# Patient Record
Sex: FEMALE | Race: WHITE | NOT HISPANIC OR LATINO | Employment: OTHER | URBAN - METROPOLITAN AREA
[De-identification: names, ages, dates, MRNs, and addresses within clinical notes are randomized per-mention and may not be internally consistent; named-entity substitution may affect disease eponyms.]

---

## 2017-07-28 ENCOUNTER — TRANSCRIBE ORDERS (OUTPATIENT)
Dept: ADMINISTRATIVE | Facility: HOSPITAL | Age: 82
End: 2017-07-28

## 2017-07-28 DIAGNOSIS — R13.10 DYSPHAGIA, UNSPECIFIED TYPE: Primary | ICD-10-CM

## 2017-08-08 ENCOUNTER — GENERIC CONVERSION - ENCOUNTER (OUTPATIENT)
Dept: OTHER | Facility: OTHER | Age: 82
End: 2017-08-08

## 2017-08-08 ENCOUNTER — HOSPITAL ENCOUNTER (OUTPATIENT)
Dept: RADIOLOGY | Facility: HOSPITAL | Age: 82
Discharge: HOME | End: 2017-08-08
Payer: MEDICARE

## 2017-08-08 DIAGNOSIS — R13.10 DYSPHAGIA, UNSPECIFIED TYPE: ICD-10-CM

## 2017-08-08 PROCEDURE — 92611 MOTION FLUOROSCOPY/SWALLOW: CPT

## 2017-08-08 PROCEDURE — G8996 SWALLOW CURRENT STATUS: HCPCS

## 2017-08-08 PROCEDURE — G8998 SWALLOW D/C STATUS: HCPCS

## 2017-08-08 PROCEDURE — G8997 SWALLOW GOAL STATUS: HCPCS

## 2017-08-08 PROCEDURE — 74230 X-RAY XM SWLNG FUNCJ C+: CPT

## 2017-08-14 ENCOUNTER — GENERIC CONVERSION - ENCOUNTER (OUTPATIENT)
Dept: OTHER | Facility: OTHER | Age: 82
End: 2017-08-14

## 2018-01-13 NOTE — RESULT NOTES
Verified Results  FL BARIUM Avelina Gómez SPEECH [id number] 10:13AM Mary Ruiz     Test Name Result Flag Reference   FL BARIUM SWALLOW Raymond Marvin (Report)     A video barium swallow study was performed by the Department of Speech Pathology  Please refer to the report for the official interpretation  The images are stored in PACS for archival purposes only  Study images were not formally reviewed by the Radiology Department     [email or web address]   Modality Radiation Exposure Data    Order Radiation    Type Dose Range    Radiation Dose 23 88 mGy 0 - 30 mGy

## 2018-01-17 NOTE — PROCEDURES
Procedures by BREANNE Gates at 8/8/2017 11:00 AM      Author:  BREANNE Gates Service:  (none) Author Type:  Speech and Language Pathologist    Filed:  8/8/2017  1:02 PM Date of Service:  8/8/2017 11:00 AM Status:  Signed    :  BREANNE Gates (Speech and Language Pathologist)        Pre-procedure Diagnoses:       1  Gastroesophageal reflux disease, esophagitis presence not specified [K21 9]                Post-procedure Diagnoses:       1  Gastroesophageal reflux disease, esophagitis presence not specified [K21 9]                Procedures:       1  FL BARIUM Joaquinacarl Shane SPEECH [EIS075 (Custom)]                                                    Video Swallow Study      Patient Name: Yenni RODRIGUEZ Date: 8/8/2017        Past Medical History  Past Medical History:     Diagnosis  Date    Anemia     Falls     Hypertension     Multiple thyroid nodules     Osteoporosis     Subdural hematoma      par  Past Surgical History  Past Surgical History:      Procedure  Laterality Date    JOINT REPLACEMENT      ORIF HIP FRACTURE Left      Pt is a 81 y/o F referred for a VBS from nursing home  SLP accompanied patient and provided a brief history  For approximately 6 months, pt has been seen multiple times by speech for intermittent  coughing c meals  Pt has been recommended for different diets but continues to have difficulties  VBS ordered to determine presence of aspiration and determine safest least restrictive diet  Previous VBS: No    Current Diet: Level 1 puree c NTL     Vocal Quality/Speech: WFL    Cognitive status: appears grossly intact      Consistencies administered: Barium laden applesauce, banana, honey thick, nectar thick, thin liquids  Liquids were administered by tsp, cup and straw  Pt was seated laterally at 90 degrees  Oral stage:   Moderately Impaired  Lip closure: adequate  Mastication: mildly prolonged  Bolus formation: adequate  Bolus control:mildly decreased  Transfer: mild-mod delayed  Residue: mild-mod    Pharyngeal stage: Moderately-Severely Impaired  Swallow promptness: prompt to mildly delayed  Spill to valleculae: yes, c thin liquid  Spill to pyriforms: yes, c vallecular residue  Epiglottic inversion: minimal to no inversion  Laryngeal rise: weak  Pharyngeal constriction: decreased  Vallecular retention: yes  Pyriform retention: yes  PPW coating: yes  Osteophytes: no  CP prominence: no  Transient penetration: yes  Epiglottic undercoat: yes  Penetration: yes  Aspiration: yes  Strategies: alternating food/liquid, prompted cough  Response to aspiration: none    Screening of Esophageal stage:  Delayed clearance from the distal esophagus    Summary:  Pt presents c mod-severe oropharyngeal dysphagia characterized by prolonged holding, tongue pump transfer, weak tongue drive, significant vallecular retention as well as posterior wall retention and spillage of retention to the pyriforms  Pt noted to initially have deep penetration to the level of the vocal cords posteriorly c puree  Pt prompted to cough, which was weak and unable to clear penetration  No further penetration noted c puree  When trialing HTL and NTL, pt tolerated both without  penetration/aspiration but noted to have decreased vallecular retention c NTL, reducing the risk for overflow penetration/aspiration  Pt tolerated trial of level 2 soft banana but noted to have vallecular retention which was significantly decreased c  a liquid wash  When trialing thin liquid via straw sips, pt noted to have episodes of transient penetration and penetration to the vocal cords with no response  Of note, pt unable to clear penetration to the cords during study and ultimately aspirated  with no response  Scanned the esophagus upon completion of study and pt noted to have delayed clearance/retention at the distal esophagus       Images available to be viewed on PACS however, due to technical difficulties c equipment, not all images were captured  Recommendations:  Diet: level 1 puree  Liquids: NTL  Meds: crush in applesauce, use liquid wash between bites  Strategies: prompt for a double swallow, strictly alternate food/liquid, small bites/sips, prompt for intermittent throat clear/cough to attempt to clear penetration through out meals  Upright position  F/u ST tx: yes to educate on strategies and ensure carryover  Full Supervision  Aspiration Precautions  Reflux Precautions  Results reviewed with: pt, SLP  If a dedicated assessment of the esophagus is desired, consider esophagram/barium swallow                        Received for:Provider  EPIC   Aug  8 2017  1:03PM [facility] Standard Time

## 2019-05-21 ENCOUNTER — NURSING HOME VISIT (OUTPATIENT)
Dept: GERIATRICS | Facility: OTHER | Age: 84
End: 2019-05-21
Payer: MEDICARE

## 2019-05-21 DIAGNOSIS — H61.23 BILATERAL IMPACTED CERUMEN: Primary | ICD-10-CM

## 2019-05-21 PROBLEM — H61.20 CERUMEN IMPACTION: Status: ACTIVE | Noted: 2019-05-21

## 2019-05-21 PROCEDURE — 99307 SBSQ NF CARE SF MDM 10: CPT | Performed by: NURSE PRACTITIONER

## 2019-05-24 ENCOUNTER — NURSING HOME VISIT (OUTPATIENT)
Dept: GERIATRICS | Facility: OTHER | Age: 84
End: 2019-05-24
Payer: MEDICARE

## 2019-05-24 DIAGNOSIS — F06.30 MOOD DISORDER DUE TO A GENERAL MEDICAL CONDITION: ICD-10-CM

## 2019-05-24 DIAGNOSIS — I10 BENIGN ESSENTIAL HTN: Primary | Chronic | ICD-10-CM

## 2019-05-24 DIAGNOSIS — E03.8 OTHER SPECIFIED HYPOTHYROIDISM: ICD-10-CM

## 2019-05-24 PROBLEM — H61.20 CERUMEN IMPACTION: Status: RESOLVED | Noted: 2019-05-21 | Resolved: 2019-05-24

## 2019-05-24 PROCEDURE — 99309 SBSQ NF CARE MODERATE MDM 30: CPT | Performed by: INTERNAL MEDICINE

## 2019-07-17 ENCOUNTER — NURSING HOME VISIT (OUTPATIENT)
Dept: GERIATRICS | Facility: OTHER | Age: 84
End: 2019-07-17
Payer: MEDICARE

## 2019-07-17 DIAGNOSIS — J44.9 CHRONIC OBSTRUCTIVE PULMONARY DISEASE, UNSPECIFIED COPD TYPE (HCC): ICD-10-CM

## 2019-07-17 DIAGNOSIS — F03.90 DEMENTIA WITHOUT BEHAVIORAL DISTURBANCE, UNSPECIFIED DEMENTIA TYPE (HCC): ICD-10-CM

## 2019-07-17 DIAGNOSIS — I10 BENIGN ESSENTIAL HTN: Chronic | ICD-10-CM

## 2019-07-17 DIAGNOSIS — R53.81 DEBILITY: Primary | ICD-10-CM

## 2019-07-17 DIAGNOSIS — I87.303 STASIS EDEMA OF BOTH LOWER EXTREMITIES: ICD-10-CM

## 2019-07-17 DIAGNOSIS — F06.30 MOOD DISORDER DUE TO A GENERAL MEDICAL CONDITION: ICD-10-CM

## 2019-07-17 DIAGNOSIS — E03.8 OTHER SPECIFIED HYPOTHYROIDISM: ICD-10-CM

## 2019-07-17 PROCEDURE — 99309 SBSQ NF CARE MODERATE MDM 30: CPT | Performed by: NURSE PRACTITIONER

## 2019-07-17 NOTE — ASSESSMENT & PLAN NOTE
-stable; no edema noted on today's exam   -weights stable  -continue lasix 20 mg daily with potassium supplementation   -continue to monitor

## 2019-07-17 NOTE — ASSESSMENT & PLAN NOTE
-stable  -last TSH 2 71 (3/2017); repeat level ordered for 7/18/19  -continue levothyroxine 50 mcg daily

## 2019-07-17 NOTE — ASSESSMENT & PLAN NOTE
-stable; no s/sx   -continue breo ellipta and incruse ellipta daily, and ventolin PRN  -c/s pulm as needed   -continue to monitor

## 2019-07-17 NOTE — ASSESSMENT & PLAN NOTE
-progressing   -no change in mood or behavior  -c/s psych as ordered   -weights stable  -requiring 24/7 support at [facility] for all ADLs; able to feed oneself   -continue safe environment   -continue to monitor

## 2019-07-17 NOTE — ASSESSMENT & PLAN NOTE
-stable; BP logs reviewed   -continue lasix 20 mg daily and irbesartan 150mg daily   -continue to monitor

## 2019-07-17 NOTE — ASSESSMENT & PLAN NOTE
-no change in mood or behavior from nursing   -encourage activity and socialization as tolerated   -continue lexapro 10 mg daily and clonazepam 0 5mg (half tab) q 12   -continue to monitor

## 2019-07-17 NOTE — PROGRESS NOTES
[facility]  [address]  [phone] [address]  Code 32      NAME: Anabell Daniel  AGE: 80 y o  SEX: female    : 1919    Code Status: AND/DNR    DATE OF ENCOUNTER: 2019    Assessment and Plan     Problem List Items Addressed This Visit        Endocrine    Other specified hypothyroidism     -stable  -last TSH 2 71 (3/2017); repeat level ordered for 19  -continue levothyroxine 50 mcg daily             Respiratory    COPD (chronic obstructive pulmonary disease) ([facility] [address] )     -stable; no s/sx   -continue breo ellipta and incruse ellipta daily, and ventolin PRN  -c/s pulm as needed   -continue to monitor             Cardiovascular and Mediastinum    Benign essential HTN (Chronic)     -stable; BP logs reviewed   -continue lasix 20 mg daily and irbesartan 150mg daily   -continue to monitor             Nervous and Auditory    Mood disorder due to a general medical condition     -no change in mood or behavior from nursing   -encourage activity and socialization as tolerated   -continue lexapro 10 mg daily and clonazepam 0 5mg (half tab) q 12   -continue to monitor          Dementia     -progressing   -no change in mood or behavior  -c/s psych as ordered   -weights stable  -requiring 24/7 support at LT for all ADLs; able to feed oneself   -continue safe environment   -continue to monitor             Other    Stasis edema of both lower extremities     -stable; no edema noted on today's exam   -weights stable  -continue lasix 20 mg daily with potassium supplementation   -continue to monitor          Debility - Primary     -secondary to chronic medical conditions  -continue support 24/7 at [facility]               No orders of the defined types were placed in this encounter  Chief Complaint     Debility and follow-up of chronic medical conditions      History of Present Illness     79 yo female, resident of [address] seen in collaboration with nursing to evaluate debility secondary to her chronic medical conditions including but not limited to HTN, COPD, dementia, mood disorder and hypothyroidism  No concerns from nursing  Upon exam, resident sitting in wheelchair without any distress  No complaints except for interrupted sleep from nursing staff waking her up in the AM  Denied pain, chest pain, shortness of breath, N/V/D or constipation  ROS limited secondary to dementia  The following portions of the patient's history were reviewed and updated as appropriate: allergies, current medications, past family history, past medical history, past social history, past surgical history and problem list     Review of Systems   ROS limited secondary to dementia     Review of Systems   Constitutional: Negative for chills and fatigue  Respiratory: Negative for cough and shortness of breath  Cardiovascular: Negative for chest pain, palpitations and leg swelling  Gastrointestinal: Negative for abdominal distention, abdominal pain, constipation, diarrhea, nausea and vomiting  Musculoskeletal: Negative for arthralgias  Neurological: Negative for dizziness, light-headedness and headaches  Psychiatric/Behavioral: Positive for sleep disturbance  The patient is not nervous/anxious  Active Problem List     Patient Active Problem List   Diagnosis    Benign essential HTN    Stasis edema of both lower extremities    Other specified hypothyroidism    Mood disorder due to a general medical condition    Gastroesophageal reflux disease without esophagitis    Osteoporosis    Subdural hematoma (HCC)    Closed right hip fracture (HCC)    Dementia    COPD [facility]chronic obstructive pulmonary disease) ([facility] [address] )    Debility       Objective     BP: 130/80, HR 64, temp: 97 8, RR 18, weight: 129 1 lbs (stable)    Physical Exam   Constitutional: She appears well-developed and well-nourished  No distress     Cooperative during exam; appearing stated age    Eyes: Pupils are equal, round, and reactive to light  Right eye exhibits no discharge  Left eye exhibits no discharge  Cardiovascular: Normal rate, regular rhythm, normal heart sounds and intact distal pulses  No murmur heard  No edema     Pulmonary/Chest: Effort normal and breath sounds normal  No respiratory distress  She has no wheezes  She has no rales  Abdominal: Soft  Bowel sounds are normal  She exhibits no distension  There is no tenderness  Neurological: She is alert  Oriented to name    Skin: Skin is warm and dry  Capillary refill takes less than 2 seconds  She is not diaphoretic  No erythema  No pallor  Dry areas of skin on face     Nursing note and vitals reviewed  Pertinent Laboratory/Diagnostic Studies:  Reviewed (BMP 2/2019)    Current Medications       Medications reviewed and updated, see facility [facility] for details       KARINA Lindsey   [facility]  7/17/2019 3:32 PM

## 2019-08-07 ENCOUNTER — NURSING HOME VISIT (OUTPATIENT)
Dept: GERIATRICS | Facility: OTHER | Age: 84
End: 2019-08-07
Payer: MEDICARE

## 2019-08-07 DIAGNOSIS — H61.23 IMPACTED CERUMEN OF BOTH EARS: Primary | ICD-10-CM

## 2019-08-07 PROCEDURE — 69209 REMOVE IMPACTED EAR WAX UNI: CPT | Performed by: NURSE PRACTITIONER

## 2019-08-07 NOTE — PROGRESS NOTES
[facility]  [address]  [phone]) [address]  Code  32      NAME: Nora Bender  AGE: 80 y o  SEX: female    : 1919    Code Status: AND/DNR    DATE OF ENCOUNTER: 2019    Assessment and Plan     Problem List Items Addressed This Visit        Nervous and Auditory    Impacted cerumen of both ears - Primary     -recurrent condition   -debrox given x 4 days and now here today to perform ear lavage (last done in 2019)  -resident denies decreased hearing, pain, irritation, dizziness or any other associated s/sx   -see procedural note for further details   -continue to re-evaluate as needed                Orders Placed This Encounter   Procedures    Ear cerumen removal           Chief Complaint     B/L impacted cerumen     History of Present Illness     79 yo female, resident of [address], seen in collaboration with nursing to perform ear lavage for bilateral impacted cerumen  Resident underwent a 4 day course of debrox ear gtts  No concerns from nursing  Upon exam, resident sitting comfortably in her wheelchair without any distress  ROS limited secondary to dementia        The following portions of the patient's history were reviewed and updated as appropriate: allergies, current medications, past family history, past medical history, past social history, past surgical history and problem list     Review of Systems     Review of Systems   Unable to perform ROS: Dementia       Active Problem List     Patient Active Problem List   Diagnosis    Benign essential HTN    Stasis edema of both lower extremities    Other specified hypothyroidism    Mood disorder due to a general medical condition    Gastroesophageal reflux disease without esophagitis    Osteoporosis    Subdural hematoma ([address] )    Closed right hip fracture ([address] )    Dementia    COPD (chronic obstructive pulmonary disease) ([address] )    Debility    Impacted cerumen of both ears       Objective     Ear cerumen removal  Date/Time: 8/7/2019 10:06 AM  Performed by: KARINA Mejia  Authorized by: KARINA Mejia     Patient location:  Bedside  Other Assisting Provider: No    Consent:     Consent obtained:  Verbal    Consent given by:  Patient    Risks discussed:  Pain, incomplete removal and dizziness    Alternatives discussed:  Observation  Universal protocol:     Procedure explained and questions answered to patient or proxy's satisfaction: yes      Site/side marked: yes      Patient identity confirmed:  Verbally with patient  Procedure details:     Local anesthetic:  None    Location:  L ear and R ear    Procedure type: irrigation only      Equipment used:  Otoclear ear lavage   Post-procedure details:     Complication:  None    Post-procedure hearing quality: unable to assess     Patient tolerance of procedure:  Procedure terminated at patient's request  Comments:      Somewhat successful ear lavage this morning  Half impacted cerumen removed from bilateral ears  Resident asked me to stop after she started verbalizing that it was too uncomfortable for her as well as nonverbal s/sx of discomfort  Right and left ear canal and TM were still able to be visualized  No s/sx of infection or fluid  Physical Exam   Constitutional:   Appearing chronically ill    HENT:   Right Ear: Tympanic membrane, external ear and ear canal normal  No swelling or tenderness  Tympanic membrane is not injected, not erythematous and not bulging  No middle ear effusion  Left Ear: Tympanic membrane, external ear and ear canal normal  No swelling or tenderness  Tympanic membrane is not injected, not perforated, not erythematous and not bulging  No middle ear effusion  Eyes: Pupils are equal, round, and reactive to light  Right eye exhibits no discharge  Left eye exhibits no discharge  Neurological: She is alert     Psychiatric:   cooperative for most of the ear lavage; however, terminated early for discomfort    Nursing note and vitals reviewed  Pertinent Laboratory/Diagnostic Studies:  n/a    Current Medications       Medications reviewed and updated, see facility [facility] for details       KARINA Arnold   [facility]  8/7/2019 10:18 AM

## 2019-08-07 NOTE — ASSESSMENT & PLAN NOTE
-recurrent condition   -debrox given x 4 days and now here today to perform ear lavage (last done in 5/2019)  -resident denies decreased hearing, pain, irritation, dizziness or any other associated s/sx   -see procedural note for further details   -continue to re-evaluate as needed

## 2019-08-09 ENCOUNTER — NURSING HOME VISIT (OUTPATIENT)
Dept: GERIATRICS | Facility: OTHER | Age: 84
End: 2019-08-09
Payer: MEDICARE

## 2019-08-09 DIAGNOSIS — R05.9 COUGH: Primary | ICD-10-CM

## 2019-08-09 PROCEDURE — 99307 SBSQ NF CARE SF MDM 10: CPT | Performed by: NURSE PRACTITIONER

## 2019-08-09 NOTE — PROGRESS NOTES
[facility]  [address]  [phone]) [address]  Code 32    NAME: Jenny Ruiz  AGE: 80 y o  SEX: female    : 1919    Code Status: AND/DNR    DATE OF ENCOUNTER: 2019    Assessment and Plan     Problem List Items Addressed This Visit        Other    Cough - Primary     -productive cough developed overnight accompanied by coarse breath sounds; afebrile; tolerating PO intake  -history of COPD  -will start to treat conservatively with scheduled neb treatments and Robafen DM   -VS q shift x 5 days   -continue to monitor for any clinical decline   -DWN               No orders of the defined types were placed in this encounter  Chief Complaint     Productive cough     History of Present Illness     81 yo female, resident of [address] [place] [address], seen in collaboration with nursing to evaluate her change in clinical status  Nursing noted that resident developed a productive cough overnight with coarse breath sounds; no other associated signs or symptoms and abebrile  No other concerns from nursing  Upon assesssment, resident sitting in her wheelchair without any non-verbal s/sx of discomfort or respiratory distress  ROS limited secondary to dementia  The following portions of the patient's history were reviewed and updated as appropriate: allergies, current medications, past family history, past medical history, past social history, past surgical history and problem list     Review of Systems     ROS limited secondary to dementia  Review of Systems   HENT: Negative for ear pain and sore throat  Respiratory: Positive for cough  Negative for shortness of breath  Musculoskeletal: Negative for arthralgias         Active Problem List     Patient Active Problem List   Diagnosis    Benign essential HTN    Stasis edema of both lower extremities    Other specified hypothyroidism    Mood disorder due to a general medical condition    Gastroesophageal reflux disease without esophagitis    Osteoporosis    Subdural hematoma (HCC)    Closed right hip fracture (HCC)    Dementia    COPD (chronic obstructive pulmonary disease) (HCC)    Debility    Impacted cerumen of both ears    Cough       Objective     /70, HR 80, temp: 98 2, RR 18, 93% RA     Physical Exam   Constitutional: No distress  Chronically ill appearing    HENT:   Nose: Nose normal    Mouth/Throat: Oropharynx is clear and moist  No oropharyngeal exudate  Eyes: Pupils are equal, round, and reactive to light  Conjunctivae are normal  Right eye exhibits no discharge  Left eye exhibits no discharge  Cardiovascular: Normal rate, regular rhythm, normal heart sounds and intact distal pulses  Pulmonary/Chest: No stridor  No respiratory distress  She has no wheezes  She has no rales  rhonchi heard throughout lung fields bilaterally; poor inspiratory effort    Abdominal: Soft  Bowel sounds are normal  She exhibits no distension  There is no tenderness  Neurological: She is alert  Skin: She is not diaphoretic  Nursing note and vitals reviewed  Pertinent Laboratory/Diagnostic Studies:  Reviewed     Current Medications       Medications reviewed and updated, see facility [facility] for details         KARINA Gonzalez   [facility]  8/9/2019 10:37 AM

## 2019-08-12 ENCOUNTER — NURSING HOME VISIT (OUTPATIENT)
Dept: GERIATRICS | Facility: OTHER | Age: 84
End: 2019-08-12
Payer: MEDICARE

## 2019-08-12 DIAGNOSIS — J06.9 VIRAL UPPER RESPIRATORY TRACT INFECTION: ICD-10-CM

## 2019-08-12 DIAGNOSIS — R50.9 LOW GRADE FEVER: Primary | ICD-10-CM

## 2019-08-12 PROCEDURE — 99307 SBSQ NF CARE SF MDM 10: CPT | Performed by: NURSE PRACTITIONER

## 2019-08-12 NOTE — ASSESSMENT & PLAN NOTE
Questionable COPD exacerbation  -chest xray   BMP and CBC   Add guanifesin PRN to scheduled doses of Robafen   Continue

## 2019-08-12 NOTE — PROGRESS NOTES
[facility]  [address]  [phone]) [address]  Code 32      NAME: Michelle Gore  AGE: 80 y o  SEX: female    : 1919    Code Status: AND/DNR    DATE OF ENCOUNTER: 2019    Assessment and Plan     Problem List Items Addressed This Visit        Respiratory    Viral upper respiratory tract infection     -no improvement in condition since   -Questionable COPD component vs PNA; will further evaluate with BMP, CBC and 2V chest xray   -Add guanifesin PRN with scheduled doses of Robafen and nebs  -IV gentle hydration   -Continue supportive therapy   -continue to monitor for any change in condition             Other    Low grade fever - Primary     -developed 99 9 temp earlier this AM accompanied by tachycardia   -Start gentle hydration in setting of decreased PO intake; 500ml over 8 hours   -encourage PO intake as tolerated  -PRN Tylenol                No orders of the defined types were placed in this encounter  Chief Complaint     Cough, fever and decreased PO intake     History of Present Illness     79 yo female, resident of [address], seen in collaboration with nursing to evaluate decline in condition  Resident was started on neb treatments and decongestants on  for suspected URI with no improvement over the weekend  This AM, resident developed fever of 99 9 accompanied by increased coughing, tachycardia and decreased PO intake  ROS limited secondary to confusion  She stated, "I don't feel well "     Discussed plan of care with nursing  The following portions of the patient's history were reviewed and updated as appropriate: allergies, current medications, past family history, past medical history, past social history, past surgical history and problem list     Review of Systems     ROS limited secondary to dementia       Review of Systems   Constitutional: Positive for activity change (decreased activity tolerance ) and fatigue  Respiratory: Positive for cough  Gastrointestinal: Negative for abdominal pain  Active Problem List     Patient Active Problem List   Diagnosis    Benign essential HTN    Stasis edema of both lower extremities    Other specified hypothyroidism    Mood disorder due to a general medical condition    Gastroesophageal reflux disease without esophagitis    Osteoporosis    Subdural hematoma (HCC)    Closed right hip fracture (HCC)    Dementia    COPD (chronic obstructive pulmonary disease) (HCC)    Debility    Impacted cerumen of both ears    Cough    Low grade fever    Viral upper respiratory tract infection       Objective     /64,  (palpated), 90% RA, temp: 99 9, RR 30    Physical Exam   Constitutional: She appears distressed (r/t fatigue )  HENT:   Nose: Nose normal    Mouth/Throat: Oropharynx is clear and moist  No oropharyngeal exudate  Cardiovascular: Regular rhythm, normal heart sounds and intact distal pulses  Tachycardia      Pulmonary/Chest: No stridor  She is in respiratory distress (r/t tachypnea )  She exhibits no tenderness  Rhonchi heard throughout lung fields; poor inspiratory effort    Abdominal: Soft  Bowel sounds are normal  She exhibits no distension  There is no tenderness  Neurological: She is alert  Skin: Skin is warm and dry  No rash noted  She is not diaphoretic  No erythema  No pallor  Nursing note and vitals reviewed  Pertinent Laboratory/Diagnostic Studies:  Reviewed     Current Medications       Medications reviewed and updated, see facility [facility] for details         KARINA Og   [facility]  8/12/2019 11:53 AM

## 2019-08-12 NOTE — ASSESSMENT & PLAN NOTE
-no improvement in condition since 8/9  -Questionable COPD component vs PNA; will further evaluate with BMP, CBC and 2V chest xray   -Add guanifesin PRN with scheduled doses of Robafen and nebs  -IV gentle hydration   -Continue supportive therapy   -continue to monitor for any change in condition

## 2019-08-12 NOTE — ASSESSMENT & PLAN NOTE
-developed 99 9 temp earlier this AM accompanied by tachycardia   -Start gentle hydration in setting of decreased PO intake; 500ml over 8 hours   -encourage PO intake as tolerated  -PRN Tylenol

## 2019-08-14 ENCOUNTER — NURSING HOME VISIT (OUTPATIENT)
Dept: GERIATRICS | Facility: OTHER | Age: 84
End: 2019-08-14
Payer: MEDICARE

## 2019-08-14 DIAGNOSIS — R06.03 RESPIRATORY DISTRESS: Primary | ICD-10-CM

## 2019-08-14 DIAGNOSIS — I10 BENIGN ESSENTIAL HTN: Chronic | ICD-10-CM

## 2019-08-14 DIAGNOSIS — F03.90 DEMENTIA WITHOUT BEHAVIORAL DISTURBANCE, UNSPECIFIED DEMENTIA TYPE (HCC): ICD-10-CM

## 2019-08-14 DIAGNOSIS — E03.8 OTHER SPECIFIED HYPOTHYROIDISM: ICD-10-CM

## 2019-08-14 DIAGNOSIS — R53.81 DEBILITY: ICD-10-CM

## 2019-08-14 DIAGNOSIS — Z51.5 COMFORT MEASURES ONLY STATUS: ICD-10-CM

## 2019-08-14 DIAGNOSIS — F06.30 MOOD DISORDER DUE TO A GENERAL MEDICAL CONDITION: ICD-10-CM

## 2019-08-14 PROCEDURE — 99310 SBSQ NF CARE HIGH MDM 45: CPT | Performed by: STUDENT IN AN ORGANIZED HEALTH CARE EDUCATION/TRAINING PROGRAM

## 2019-08-14 NOTE — PROGRESS NOTES
[address]  [phone]  [facility] Progress Note  Code:32        NAME: Haseeb Baugh  AGE: 80 y o  SEX: female    DATE OF ENCOUNTER: 8/14/2019    Assessment and Plan     Problem List Items Addressed This Visit        Endocrine    Other specified hypothyroidism     Patient's code status has been changed to comfort care measures  Will continue levothyroxine            Cardiovascular and Mediastinum    Benign essential HTN (Chronic)     /48  Blood pressure has been for gradually decreasing  Patient's code status has been changed to comfort care measures  Discontinue irbesartan  Continue Lasix daily            Nervous and Auditory    Mood disorder due to a general medical condition     Patient's code status is currently comfort care measures only  Discontinue the citalopram  Continue Klonopin/Roxanol/DuoNeb/bowel regimen p r n  Dementia     Patient's code status currently changed to comfort care measures  Continue Roxanol/DuoNeb/Klonopin p r n  Maintain [place] precautions            Other    Debility     Patient's code status has been switched to comfort care, after discussion with patient's [employer], her daughter  Chronic medications discontinued  Start Roxanol p r n   For shortness of breath/respiratory distress  Continue supportive care  Maintain [place] precautions         Respiratory distress - Primary     Patient currently on oxygen via nasal cannula at 1 liter/minute to maintain saturations above 88%   Tachypneic with RR 32 per minute, blood pressure decreased at 104/48  Chest x-ray done 8/12/19 showed chronic appearing interstitial lung changes with basilar pleural-parenchymal scarring and subsegmental atelectasis  Blood work reviewed from 08/12/2019 with no new significant abnormalities  After a detailed discussion at patient's daughter Janette Booth who is her power of  for healthcare, patient's code status was changed from DNI/DNR to comfort care  Start Roxanol 2 5 mg p o  Q 4h p r n  For shortness of breath/respiratory distress/pain  Start duo nebs q 4h p r n  For respiratory distress/shortness of breath  Discontinue Breo Ellipta, i           Comfort measures only status     Discussed with patient's daughter Andi Gorman, who is her power-of- for healthcare this morning about patient's current medical status  Code status was changed by her daughter to comfort care measures only   Patient noted to be in respiratory distress, extremely weak, frail and lethargic  Has been minimally verbal with marked decrease in p o  Intake  Currently requiring oxygen via nasal cannula for shortness of breath  Chronic medications discontinued  Start Roxanol 2 5 mg p o  Q 4h p r n  For shortness of breath/respiratory distress/pain  Continue levothyroxine, continue Klonopin p r n  For anxiety  Start duo nebs q 4h p r n  Respiratory distress  Continue bowel regimen  Maintain [place] precautions  Mattress to be changed  Nursing staff aware                   Chief Complaint     Requested by nurse to review patient was noted to be lethargic, short of breath in respiratory distress    History of Present Illness     HPI    This is a 70-year-old female with a past medical history of debility, hypertension, COPD, dementia and mood disorder who was noted to become unwell 1 week ago  Patient initially began having a cough and subsequent fever with T-max of 101° on 08/12  Chest x-ray and blood work were ordered which showed chronic appearing interstitial lung changes with basilar pleural parenchymal scarring and subsegmental atelectasis  No leukocytosis or leukopenia noted on blood work  Patient was started on duo nebs, guaifenesin and received 500 cc of IV fluids  This morning, staff stated that patient has had a marked decrease in p o  intake  Patient is extremely lethargic, frail and weak    She has been tachypneic with respiratory rate in the 30s and has required oxygen via nasal cannula to keep oxygen saturations above 88%  Patient is minimally verbal this morning and only answers to her first name when questioned  She has remained afebrile in the past 24 hours  I had a detailed discussion with patient's daughter Devendra Augustin on the telephone who is the patient's  power of  for healthcare  Daughter expresses that she would like no further active intervention in her mother's care as her mother had discussed with her in the past that she would not like any heroic measures should the patient's health deteriorate suddenly  Daughter declines any further workup inclusive of imaging studies or blood work  She also declines sending the patient out to hospital, starting treatment with any IV therapy,  increased diuretic therapy or antibiotics at this time  Patient's code status will be changed to comfort care measures only  We will start Roxanol and discontinue chronic medications and ensure that the patient's goal of care is comfort    Nursing care informed    Time spent in management of patient's acute condition > 40mins    The following portions of the patient's history were reviewed and updated as appropriate: allergies, current medications, past family history, past medical history, past social history, past surgical history and problem list     Review of Systems     Review of Systems   Unable to perform ROS: Acuity of condition   Patient extremely lethargic, tachypneic, unable to respond to questions    Active Problem List     Patient Active Problem List   Diagnosis    Benign essential HTN    Stasis edema of both lower extremities    Other specified hypothyroidism    Mood disorder due to a general medical condition    Gastroesophageal reflux disease without esophagitis    Osteoporosis    Subdural hematoma ([address] )    Cl[facility]    Dementia    COPD (chronic obstructive pulmonary disease) ([facility]    Debility    Impacted cerumen of both ears    Cough    Low grade fever    Viral upper respiratory tract infection    Respiratory distress    Comfort measures only status       Objective     Blood pressure 104/48, temperature 98, oxygen saturations 93% on 1 L oxygen via nasal cannula, pulse 84, RR 36, weight 129 lbs    Physical Exam   Constitutional: She appears distressed  Patient lying in bed with oxygen via nasal cannula in mild respiratory distress with tachypnea  Extremely lethargic and frail, minimally responsive to verbal questioning   HENT:   Head: Normocephalic and atraumatic  Nose: Nose normal    Eyes: Conjunctivae are normal  Right eye exhibits no discharge  Left eye exhibits no discharge  No scleral icterus  Neck: Neck supple  No JVD present  Cardiovascular: Regular rhythm  Murmur (ESM 2/6) heard  Pulmonary/Chest: She is in respiratory distress  She has rales  RR 32 per minute, on O2 1 liter/minute via nasal cannula  Air entry decreased bilaterally, rales throughout both left and right lung fields  No wheeze     Abdominal: Soft  Bowel sounds are normal  She exhibits no distension and no mass  There is no tenderness  There is no rebound and no guarding  Musculoskeletal: She exhibits edema  She exhibits no tenderness  Neurological:   Patient extremely lethargic, is able to respond only when asked her name which she gives her 1st name only   Skin: Capillary refill takes 2 to 3 seconds  She is diaphoretic  There is pallor  Pertinent Laboratory/Diagnostic Studies:  Bloodwork from 08/12/2019  Glucose 104, BUN 23, creatinine 0 9, sodium 140, potassium 4 8, chloride 104, CO2 31, calcium 8 7  WBC 4 6, HB 10 6, HCT 31 9,   Chest x-ray:  Chronic appearing interstitial changes with basilar pleural parenchymal scarring and subsegmental atelectasis    Current Medications   Medications were reviewed and updated in facility paper chart      Ruchi Staples MD  Geriatric Medicine  8/14/2019 11:06 AM

## 2019-08-14 NOTE — ASSESSMENT & PLAN NOTE
Patient currently on oxygen via nasal cannula at 1 liter/minute to maintain saturations above 88%   Tachypneic with RR 32 per minute, blood pressure decreased at 104/48  Chest x-ray done 8/12/19 showed chronic appearing interstitial lung changes with basilar pleural-parenchymal scarring and subsegmental atelectasis  Blood work reviewed from 08/12/2019 with no new significant abnormalities  After a detailed discussion at patient's daughter Liyah Larson who is her power of  for healthcare, patient's code status was changed from DNI/DNR to comfort care  Start Roxanol 2 5 mg p o  Q 4h p r n  For shortness of breath/respiratory distress/pain  Start duo nebs q 4h p r n   For respiratory distress/shortness of breath  Discontinue Breo Tra, i

## 2019-08-14 NOTE — ASSESSMENT & PLAN NOTE
Patient's code status currently changed to comfort care measures  Continue Roxanol/DuoNeb/Klonopin p r n    Maintain [place] precautions

## 2019-08-14 NOTE — ASSESSMENT & PLAN NOTE
Discussed with patient's daughter Balyee Kirkland, who is her power-of- for healthcare this morning about patient's current medical status  Code status was changed by her daughter to comfort care measures only   Patient noted to be in respiratory distress, extremely weak, frail and lethargic  Has been minimally verbal with marked decrease in p o  Intake  Currently requiring oxygen via nasal cannula for shortness of breath  Chronic medications discontinued  Start Roxanol 2 5 mg p o  Q 4h p r n  For shortness of breath/respiratory distress/pain  Continue levothyroxine, continue Klonopin p r n  For anxiety  Start duo nebs q 4h p r n   Respiratory distress  Continue bowel regimen  Maintain [place] precautions  Mattress to be changed  Nursing staff aware

## 2019-08-14 NOTE — ASSESSMENT & PLAN NOTE
Patient's code status has been switched to comfort care, after discussion with patient's Kelly Para, her daughter  Chronic medications discontinued  Start Roxanol p r n   For shortness of breath/respiratory distress  Continue supportive care  Maintain [place] precautions

## 2019-08-14 NOTE — ASSESSMENT & PLAN NOTE
Patient's code status is currently comfort care measures only  Discontinue the citalopram  Continue Klonopin/Roxanol/DuoNeb/bowel regimen p r n

## 2019-08-14 NOTE — ASSESSMENT & PLAN NOTE
/48  Blood pressure has been for gradually decreasing  Patient's code status has been changed to comfort care measures  Discontinue irbesartan  Continue Lasix daily

## 2019-08-21 ENCOUNTER — NURSING HOME VISIT (OUTPATIENT)
Dept: GERIATRICS | Facility: OTHER | Age: 84
End: 2019-08-21
Payer: MEDICARE

## 2019-08-21 DIAGNOSIS — R05.9 COUGH: Primary | ICD-10-CM

## 2019-08-21 DIAGNOSIS — J44.9 CHRONIC OBSTRUCTIVE PULMONARY DISEASE, UNSPECIFIED COPD TYPE (HCC): ICD-10-CM

## 2019-08-21 DIAGNOSIS — E03.8 OTHER SPECIFIED HYPOTHYROIDISM: ICD-10-CM

## 2019-08-21 DIAGNOSIS — F06.30 MOOD DISORDER DUE TO MEDICAL CONDITION: ICD-10-CM

## 2019-08-21 DIAGNOSIS — F03.90 DEMENTIA WITHOUT BEHAVIORAL DISTURBANCE, UNSPECIFIED DEMENTIA TYPE (HCC): ICD-10-CM

## 2019-08-21 DIAGNOSIS — R53.81 DEBILITY: ICD-10-CM

## 2019-08-21 DIAGNOSIS — Z51.5 COMFORT MEASURES ONLY STATUS: ICD-10-CM

## 2019-08-21 PROCEDURE — 99308 SBSQ NF CARE LOW MDM 20: CPT | Performed by: STUDENT IN AN ORGANIZED HEALTH CARE EDUCATION/TRAINING PROGRAM

## 2019-08-21 NOTE — ASSESSMENT & PLAN NOTE
Patient improved from last week, with marked improvement in respiratory distress    Awaiting hospice consult for continued management

## 2019-08-21 NOTE — ASSESSMENT & PLAN NOTE
Cough has been persistent    Will restart Robitussin cough medicine and Marceil Dariela for now  Patient is hospice care however will continue the above medications for current symptoms which were previously effective in alleviating her cough

## 2019-08-21 NOTE — ASSESSMENT & PLAN NOTE
Debility secondary to chronic comorbidities and recent respiratory illness  Maintain [place] precautions  Awaiting hospice consult as daughter had indicated preference for comfort care measures moving followed

## 2019-08-21 NOTE — ASSESSMENT & PLAN NOTE
Oxygen as needed via nasal cannula to keep saturations above 90% was patient is currently comfort measures only  Will restart Breo Ellipta which had alleviated patient's cough previously  Robitussin p r n    Awaiting hospice consult

## 2019-08-21 NOTE — PROGRESS NOTES
[address]  [phone]  [facility] Progress Note  Billing code:        NAME: Velma Whitfield  AGE: 80 y o  SEX: female    DATE OF ENCOUNTER: 8/21/2019    Assessment and Plan     Problem List Items Addressed This Visit        Endocrine    Other specified hypothyroidism     Continue levothyroxine daily  Awaiting hospice consult            Respiratory    COPD (chronic obstructive pulmonary disease) (HCC)     Oxygen as needed via nasal cannula to keep saturations above 90% was patient is currently comfort measures only  Will restart Breo Ellipta which had alleviated patient's cough previously  Robitussin p r n  Awaiting hospice consult            Nervous and Auditory    Mood disorder due to medical condition     Per nursing staff, since the citalopram was discontinued and patient has improved immensely, patient has been verbalizing symptoms of depression  Will restart his citalopram 10 mg daily  Continue Ativan 0 25 mg p o  B i d  prn for anxiety  Awaiting hospice consult         Dementia     Reorientation and redirection as needed  Maintain [place] precautions  Continue nutritional, supportive care  Continued social support            Other    Gloria Sexton secondary to chronic comorbidities and recent respiratory illness  Maintain [place] precautions  Awaiting hospice consult as daughter had indicated preference for comfort care measures moving followed         Cough - Primary     Cough has been persistent  Will restart Robitussin cough medicine and David Cruise for now  Patient is hospice care however will continue the above medications for current symptoms which were previously effective in alleviating her cough         Comfort measures only status     Patient improved from last week, with marked improvement in respiratory distress    Awaiting hospice consult for continued management                 Chief Complaint     Persistent cough per nursing staff    History of Present Illness     HPI  This is a 70-year-old female with a past medical history of COPD, hypothyroidism, HTN, dementia and osteoporosis amongst multiple other medical issues who was seen and examined at bedside for persistent cough  Per nursing staff, patient has has medically improved from 1 week ago where she had respiratory distress with subsequent code status changed to comfort care  Cough is persistent and associated with green-colored sputum  No fever, chills, chest pain, shortness of breath, nausea, vomiting or recurrence of cardiorespiratory distress  Given her history of COPD, cough was previously a deviated with use of her maintenance inhalers  Patient has been alert over the past few days, tolerating oral intake well and attempting to interact with staff  Currently awaiting hospice consult for continued management moving followed  She was previously on oxygen at 2 liters/minute which was weaned off this morning is oxygen saturations persisted in the high 90s  Per nursing staff, since improving markedly over the last few days, she has exhibited signs of depression and has also stated that she does not want to live  After episode of respiratory distress and code status changed to comfort measures only, antidepressant therapy along with other chronic medications  had been discontinued      The following portions of the patient's history were reviewed and updated as appropriate: allergies, current medications, past family history, past medical history, past social history, past surgical history and problem list     Review of Systems     Review of Systems   Unable to perform ROS: Dementia       Active Problem List     Patient Active Problem List   Diagnosis    Benign essential HTN    Stasis edema of both lower extremities    Other specified hypothyroidism    Mood disorder due to medical condition    Gastroesophageal reflux disease without esophagitis    Osteoporosis  Subdural hematoma (HCC)    Closed right hip fracture (HCC)    Dementia    COPD (chronic obstructive pulmonary disease) (HCC)    Debility    Impacted cerumen of both ears    Cough    Low grade fever    Viral upper respiratory tract infection    Respiratory distress    Comfort measures only status       Objective     /62, temp 99 2°, HR 76, O2 sat-98% on 2 L by nasal cannula oxygen, RR 18    Physical Exam   Constitutional:   Elderly frail female, sitting in wheelchair in no obvious cardiorespiratory distress  Oxygen via nasal cannula in situ   HENT:   Head: Normocephalic and atraumatic  Nose: Nose normal    Eyes: Conjunctivae are normal  Right eye exhibits no discharge  Left eye exhibits no discharge  No scleral icterus  Neck: Neck supple  Cardiovascular: Exam reveals no gallop and no friction rub  Murmur heard  Pulmonary/Chest: Effort normal  No stridor  No respiratory distress  She has no wheezes  She has rales  Air entry fair bilaterally, minimal wheeze, harsh breath sounds, coarse rales bilaterally in lung bases   Abdominal: Soft  Bowel sounds are normal  She exhibits distension  There is no tenderness  There is no guarding  Musculoskeletal: She exhibits edema  Neurological: She is alert  Oriented x 0  Patient remains calm, pleasant, cooperative  Does not follow commands   Skin: Skin is warm  There is pallor  Nursing note and vitals reviewed  Pertinent Laboratory/Diagnostic Studies:  Reviewed most recent labs  No blood work ordered    Current Medications   Medications were reviewed and updated in facility paper chart      Tana Fraga MD  Geriatric Medicine  8/21/2019 1:43 PM

## 2019-08-21 NOTE — ASSESSMENT & PLAN NOTE
Per nursing staff, since the citalopram was discontinued and patient has improved immensely, patient has been verbalizing symptoms of depression    Will restart his citalopram 10 mg daily  Continue Ativan 0 25 mg p o  B i d  prn for anxiety  Awaiting hospice consult

## 2019-08-21 NOTE — ASSESSMENT & PLAN NOTE
Reorientation and redirection as needed  Maintain [place] precautions  Continue nutritional, supportive care  Continued social support

## 2019-08-28 ENCOUNTER — NURSING HOME VISIT (OUTPATIENT)
Dept: GERIATRICS | Facility: OTHER | Age: 84
End: 2019-08-28
Payer: MEDICARE

## 2019-08-28 DIAGNOSIS — I10 BENIGN ESSENTIAL HTN: Chronic | ICD-10-CM

## 2019-08-28 DIAGNOSIS — F06.30 MOOD DISORDER DUE TO MEDICAL CONDITION: ICD-10-CM

## 2019-08-28 DIAGNOSIS — F03.90 DEMENTIA WITHOUT BEHAVIORAL DISTURBANCE, UNSPECIFIED DEMENTIA TYPE (HCC): ICD-10-CM

## 2019-08-28 DIAGNOSIS — R53.81 DEBILITY: Primary | ICD-10-CM

## 2019-08-28 DIAGNOSIS — Z51.5 COMFORT MEASURES ONLY STATUS: ICD-10-CM

## 2019-08-28 DIAGNOSIS — R05.9 COUGH: ICD-10-CM

## 2019-08-28 PROCEDURE — 99309 SBSQ NF CARE MODERATE MDM 30: CPT | Performed by: STUDENT IN AN ORGANIZED HEALTH CARE EDUCATION/TRAINING PROGRAM

## 2019-08-28 NOTE — ASSESSMENT & PLAN NOTE
Blood pressure remained stable  Most recent /56  Continue furosemide 20 mg daily, potassium supplementation  Patient is currently on comfort care  Will continue with routine nursing protocol for continued monitoring of blood pressure

## 2019-08-28 NOTE — ASSESSMENT & PLAN NOTE
Symptoms much improved  Patient was weaned off oxygen over a week ago  Maintaining saturations well on room air  Continue Robitussin p r n    Clinical exam of lungs was within normal limits

## 2019-08-28 NOTE — ASSESSMENT & PLAN NOTE
Patient continues as comfort care measures only  She has recovered from an acute episode of cardiorespiratory distress  Maintain [place] precautions  Continue nutritional support, supportive care

## 2019-08-28 NOTE — ASSESSMENT & PLAN NOTE
Patient confused at baseline though was oriented to her 1st name and place  Recommend reorientation and redirection as needed  Manage chronic conditions conservatively as patient is comfort care  Encourage patient to participate in social activities at nursing home  Continue supportive care, nutritional support  Maintain [place] precautions

## 2019-08-28 NOTE — ASSESSMENT & PLAN NOTE
After an episode of respiratory distress where patient's code status was changed to comfort care measures only  Ecitalopram was discontinued  After recovering from her acute illness episode, patient briefly verbalized symptoms of depression for which a citalopram 10 mg daily was restarted    Symptoms have remained stable since restarting therapy  Continue social support  Continue clonazepam 0 5 mg p o  Q 12h  Encourage patient to participate in social activities at nursing facility

## 2019-08-28 NOTE — ASSESSMENT & PLAN NOTE
Debility secondary to chronic medical conditions requiring 24/7 care at current nursing facility    Patient is currently comfort care measures only  Continue nutritional support, supportive care  Maintain [place] precautions

## 2019-08-28 NOTE — PROGRESS NOTES
[address]  [phone]  [facility] Progress Note  Billing code:32      NAME: Edda Raymundo  AGE: 80 y o  SEX: female    DATE OF ENCOUNTER: 8/28/2019    Assessment and Plan     Problem List Items Addressed This Visit        Cardiovascular and Mediastinum    Benign essential HTN (Chronic)     Blood pressure remained stable  Most recent /56  Continue furosemide 20 mg daily, potassium supplementation  Patient is currently on comfort care  Will continue with routine nursing protocol for continued monitoring of blood pressure            Nervous and Auditory    Mood disorder due to medical condition     After an episode of respiratory distress where patient's code status was changed to comfort care measures only  Ecitalopram was discontinued  After recovering from her acute illness episode, patient briefly verbalized symptoms of depression for which a citalopram 10 mg daily was restarted  Symptoms have remained stable since restarting therapy  Continue social support  Continue clonazepam 0 5 mg p o  Q 12h  Encourage patient to participate in social activities at nursing facility           Dementia     Patient confused at baseline though was oriented to her 1st name and place  Recommend reorientation and redirection as needed  Manage chronic conditions conservatively as patient is comfort care  Encourage patient to participate in social activities at nursing home  Continue supportive care, nutritional support  Maintain Falls precautions            Other    Debility - Primary     Debility secondary to chronic medical conditions requiring 24/7 care at current nursing facility    Patient is currently comfort care measures only  Continue nutritional support, supportive care  Maintain Falls precautions         Cough     Symptoms much improved  Patient was weaned off oxygen over a week ago  Maintaining saturations well on room air  Continue Robitussin p r n  Clinical exam of lungs was within normal limits         Comfort measures only status     Patient continues as comfort care measures only  She has recovered from an acute episode of cardiorespiratory distress  Maintain Falls precautions  Continue nutritional support, supportive care               Chief Complaint     Patient reviewed for routine long-term monthly rounds for follow-up of chronic conditions    History of Present Illness     HPI  Patient seen and examined at bedside  Unable to perform review of systems given patient's history of dementia  Per nursing staff, patient has had no acute complaints  She often verbalizes repeatedly that she has insomnia and is awaken up too early in the mornings by nursing staff  However per nursing staff, in actuality, she does sleep without interruption throughout the night  Per nursing staff, patient has been tolerating oral intake well, having routine bowel movements, has not had any recent falls or any symptoms of cardiorespiratory distress  Today, during the interview, the patient was very calm, somewhat cooperative and was able to follow few commands  She continues to require reorientation and redirection frequently for her history of dementia and she has been compliant with taking her medications as she continues on levothyroxine 50 mcg daily for a history of hypothyroidism  The patient has not had any recurrent symptoms of wheezing or shortness of breath and COPD has been stable on Breo Ellipta and duo nebs as needed  She was restarted on a citalopram 10 mg daily as patient was verbalizing symptoms of depression which have since resolved since restarting  therapy  The patient continues on comfort measures      The following portions of the patient's history were reviewed and updated as appropriate: allergies, current medications, past family history, past medical history, past social history, past surgical history and problem list     Review of Systems Review of Systems   Unable to perform ROS: Dementia       Active Problem List     Patient Active Problem List   Diagnosis    Benign essential HTN    Stasis edema of both lower extremities    Other specified hypothyroidism    Mood disorder due to medical condition    Gastroesophageal reflux disease without esophagitis    Osteoporosis    Subdural hematoma (HCC)    Closed right hip fracture (HCC)    Dementia    COPD (chronic obstructive pulmonary disease) ([facility] [address] )    Debility    Impacted cerumen of both ears    Cough    Low grade fever    Viral upper respiratory tract infection    Respiratory distress    Comfort measures only status     Past Surgical History:   Procedure Laterality Date    CHOLECYSTECTOMY      HYSTERECTOMY      JOINT REPLACEMENT      ORIF HIP FRACTURE Left      Family History   Family history unknown: Yes     Social History     Socioeconomic History    Marital status:       Spouse name: None    Number of children: None    Years of education: None    Highest education level: None   Occupational History    None   Social Needs    Financial resource strain: None    Food insecurity:     Worry: None     Inability: None    Transportation needs:     Medical: None     Non-medical: None   Tobacco Use    Smoking status: Unknown If Ever Smoked   Substance and Sexual Activity    Alcohol use: No    Drug use: No    Sexual activity: Never   Lifestyle    Physical activity:     Days per week: None     Minutes per session: None    Stress: None   Relationships    Social connections:     Talks on phone: None     Gets together: None     Attends Mandaeism service: None     Active member of club or organization: None     Attends meetings of clubs or organizations: None     Relationship status: None    Intimate partner violence:     Fear of current or ex partner: None     Emotionally abused: None     Physically abused: None     Forced sexual activity: None   Other Topics Concern    None   Social History Narrative    None     No Known Allergies    Objective     /56, temp 98 3°, RR 18, O2 sat 94%, HR 72, weight 129 lb    Physical Exam   Constitutional: She appears well-developed and well-nourished  No distress  HENT:   Head: Normocephalic and atraumatic  Right Ear: External ear normal    Left Ear: External ear normal    Nose: Nose normal    Mouth/Throat: Oropharynx is clear and moist    Eyes: Conjunctivae are normal  Right eye exhibits no discharge  Left eye exhibits no discharge  No scleral icterus  Neck: Normal range of motion  Neck supple  No JVD present  Cardiovascular: Normal rate, regular rhythm and intact distal pulses  Exam reveals no gallop and no friction rub  Murmur (ESM 2/6) heard  Pulmonary/Chest: Effort normal and breath sounds normal  No stridor  No respiratory distress  She has no wheezes  She has no rales  She exhibits no tenderness  Abdominal: Soft  Bowel sounds are normal  She exhibits no distension  There is no tenderness  There is no guarding  Musculoskeletal: She exhibits no edema, tenderness or deformity  Neurological: She is alert  She has normal reflexes  No cranial nerve deficit  Oriented to name and place, calm, pleasant  Follows some commands  Moving all limbs   Skin: Skin is warm and dry  No rash noted  She is not diaphoretic  No erythema  No pallor  Psychiatric: She has a normal mood and affect  Nursing note and vitals reviewed  Pertinent Laboratory/Diagnostic Studies:  Reviewed prior blood work and diagnostic studies  No new blood work ordered today as patient is currently comfort care measures only    Current Medications   Medications were reviewed and updated in facility paper chart      Rita Pearl MD  Geriatric Medicine  8/28/2019 4:24 PM

## 2019-10-16 ENCOUNTER — NURSING HOME VISIT (OUTPATIENT)
Dept: GERIATRICS | Facility: OTHER | Age: 84
End: 2019-10-16
Payer: MEDICARE

## 2019-10-16 DIAGNOSIS — E03.8 OTHER SPECIFIED HYPOTHYROIDISM: ICD-10-CM

## 2019-10-16 DIAGNOSIS — F03.90 DEMENTIA WITHOUT BEHAVIORAL DISTURBANCE, UNSPECIFIED DEMENTIA TYPE (HCC): Primary | ICD-10-CM

## 2019-10-16 DIAGNOSIS — J44.9 CHRONIC OBSTRUCTIVE PULMONARY DISEASE, UNSPECIFIED COPD TYPE (HCC): ICD-10-CM

## 2019-10-16 DIAGNOSIS — F06.30 MOOD DISORDER DUE TO MEDICAL CONDITION: ICD-10-CM

## 2019-10-16 DIAGNOSIS — I10 BENIGN ESSENTIAL HTN: Chronic | ICD-10-CM

## 2019-10-16 DIAGNOSIS — R53.81 DEBILITY: ICD-10-CM

## 2019-10-16 PROCEDURE — 99309 SBSQ NF CARE MODERATE MDM 30: CPT | Performed by: NURSE PRACTITIONER

## 2019-10-17 NOTE — ASSESSMENT & PLAN NOTE
-stable and doing well with levothyroxine 50 mcg daily   -last TSH 2 22 on 7/18/19  -continue chronic monitoring

## 2019-10-17 NOTE — ASSESSMENT & PLAN NOTE
-no acute change in clinical condition   -incontinent   -2 person assist   -requiring 24/7 support at [facility] with all ADLs  -gradual weight loss; 7 lbs weight loss since June 2019  -continue supplemental fortified pudding QID and pureed/nectar consistency diet; continue aspiration precautions and consult speech therapy as needed   -continue to encourage activity as tolerated and encourage socialization with other residents   -continue to monitor for any clinical decline

## 2019-10-17 NOTE — ASSESSMENT & PLAN NOTE
-no acute change in mood or behavior   -continue conservative measures and medication management with Celexa 10 mg daily and clonazepam 0 5 mg q 12 hours   -continue to provide supportive environment   -continue to monitor

## 2019-10-17 NOTE — PROGRESS NOTES
[facility]  [address]  [phone]) [address]  Code  32    NAME: Flory Zavala  AGE: 80 y o   SEX: female    : 1919    Code Status: AND/DNR/DNH    DATE OF ENCOUNTER: 10/16/19    Assessment and Plan     Problem List Items Addressed This Visit        Endocrine    Other specified hypothyroidism     -stable and doing well with levothyroxine 50 mcg daily   -last TSH 2 22 on 19  -continue chronic monitoring             Respiratory    COPD (chronic obstructive pulmonary disease) (HCC)     -no s/sx of acute exacerbation   -continue maintenance medication management with breo ellipta daily   -continue to monitor for any change in clinical condition             Cardiovascular and Mediastinum    Benign essential HTN (Chronic)     -BP logs reviewed and stable  -continue medication management with lasix 20 mg daily and potassium supplementation   -continue to monitor             Nervous and Auditory    Mood disorder due to medical condition     -no acute change in mood or behavior   -continue conservative measures and medication management with Celexa 10 mg daily and clonazepam 0 5 mg q 12 hours   -continue to provide supportive environment   -continue to monitor          Dementia ([address] ) - Primary     -no acute change in clinical condition   -incontinent   -2 person assist   -requiring 24/7 support at [facility] with all ADLs  -gradual weight loss; 7 lbs weight loss since 2019  -continue supplemental fortified pudding QID and pureed/nectar consistency diet; continue aspiration precautions and consult speech therapy as needed   -continue to encourage activity as tolerated and encourage socialization with other residents   -continue to monitor for any clinical decline             Other    Debility     -secondary to her chronic medical conditions   -continue to provide 24/7 support at [facility]               No orders of the defined types were placed in this encounter  Chief Complaint     Follow-up of chronic medical conditions  History of Present Illness     81 yo female, resident of [address], seen in collaboration with nursing to evaluate chronic medical conditions including but not limited to Dementia, mood disorder, Hypothyroidism, and HTN  No concerns from nursing  Upon exam, resident sitting comfortably in her wheelchair without any s/sx of distress or discomfort  She denies pain, chest pain, shortness of breath or decreased appetite  ROS limited secondary to dementia  The following portions of the patient's history were reviewed and updated as appropriate: allergies, current medications, past family history (no pertinent FH), past medical history and problem list     Review of Systems     ROS limited secondary to dementia  Review of Systems   Constitutional: Negative for appetite change  Respiratory: Negative for shortness of breath  Cardiovascular: Negative for chest pain  Active Problem List     Patient Active Problem List   Diagnosis    Benign essential HTN    Stasis edema of both lower extremities    Other specified hypothyroidism    Mood disorder due to medical condition    Gastroesophageal reflux disease without esophagitis    Osteoporosis    Subdural hematoma (HCC)    Closed right hip fracture (HCC)    Dementia (HCC)    COPD (chronic obstructive pulmonary disease) (HCC)    Debility    Impacted cerumen of both ears    Cough    Low grade fever    Viral upper respiratory tract infection    Respiratory distress    Comfort measures only status       Objective     /72, HR 70, RR 20, 96% RA, T 97 1, weight: 122 7 lbs (130 1 lbs in 6/2019)    Physical Exam   Constitutional: No distress  Appearing chronically ill    HENT:   + halitosis, continue chlorhexidine    Eyes: Conjunctivae are normal  Right eye exhibits no discharge  Left eye exhibits no discharge     Cardiovascular: Normal rate, regular rhythm and intact distal pulses  Murmur heard  Trace edema in bilateral lower extremities    Pulmonary/Chest: Breath sounds normal  No respiratory distress  She has no wheezes  She has no rales  Poor inspiratory effort    Abdominal: Soft  Bowel sounds are normal  She exhibits no distension  There is no tenderness  Musculoskeletal: She exhibits no edema, tenderness or deformity  Neurological: She is alert  Oriented to name,  and place    Skin: She is not diaphoretic  Psychiatric: She has a normal mood and affect  Pleasant and cooperative during exam    Nursing note and vitals reviewed  Pertinent Laboratory/Diagnostic Studies:  Reviewed   19: CMP creatinine 0 90, potassium 4 8, sodium 140/ CBC hgb 10 6, hct 31 9  2019: TSH 2 22    Consults  Podiatry 19  [facility] 2019  [facility] 2019    Current Medications       Medications reviewed and updated, see facility [facility] for details  PRN usage: Tylenol x 1 in the past 30 days       KARINA Easley   [facility]  10/17/2019 9:49 AM

## 2019-10-17 NOTE — ASSESSMENT & PLAN NOTE
-BP logs reviewed and stable  -continue medication management with lasix 20 mg daily and potassium supplementation   -continue to monitor

## 2019-10-17 NOTE — ASSESSMENT & PLAN NOTE
-no s/sx of acute exacerbation   -continue maintenance medication management with breo ellipta daily   -continue to monitor for any change in clinical condition

## 2019-11-11 ENCOUNTER — NURSING HOME VISIT (OUTPATIENT)
Dept: GERIATRICS | Facility: OTHER | Age: 84
End: 2019-11-11
Payer: MEDICARE

## 2019-11-11 DIAGNOSIS — I87.303 STASIS EDEMA OF BOTH LOWER EXTREMITIES: Primary | ICD-10-CM

## 2019-11-11 PROCEDURE — 99308 SBSQ NF CARE LOW MDM 20: CPT | Performed by: NURSE PRACTITIONER

## 2019-11-11 NOTE — ASSESSMENT & PLAN NOTE
-7 5 lb weight loss since June 2019   -weight loss possibly attributed to over-diureiss and or decreased PO intake (less likely affiliated to decreased PO intake as evidenced by good appetite and supplemental shakes with meals)  -no edema noted in bilateral lower extremities   -lasix currently ordered for both stasis edema and HTN; BP logs reviewed and stable  -with slow/steady weight loss and no evidence of edema, will decrease lasix from 20 to 10 mg daily and potassium from 20 meq to 10 meq daily   -continue to monitor weights and any change in clinical condition   -LEVON

## 2019-11-11 NOTE — PROGRESS NOTES
[facility]  [address]  [phone]) [address]  Code  32    NAME: Elvis Frederick  AGE: 80 y o  SEX: female    : 1919    Code Status: AND/DNR/DNH    DATE OF ENCOUNTER: 2019    Assessment and Plan     Problem List Items Addressed This Visit        Other    Stasis edema of both lower extremities - Primary     -7 5 lb weight loss since 2019   -weight loss possibly attributed to over-diureiss and or decreased PO intake (less likely affiliated to decreased PO intake as evidenced by good appetite and supplemental shakes with meals)  -no edema noted in bilateral lower extremities   -lasix currently ordered for both stasis edema and HTN; BP logs reviewed and stable  -with slow/steady weight loss and no evidence of edema, will decrease lasix from 20 to 10 mg daily and potassium from 20 meq to 10 meq daily   -continue to monitor weights and any change in clinical condition   -DWN                No orders of the defined types were placed in this encounter  Chief Complaint     Weight loss     History of Present Illness     81 yo female, resident of [facility], seen in collaboration with nursing to evaluate her weight loss as well as her current medical treatment for lower extremity edema  No other concerns from nursing  Upon exam, resident sitting comfortably in her wheelchair with no s/sx of distress or discomfort  She is in good spirits as today is her 89th birthday! She denies pain, chest pain or shortness of breath  ROS limited secondary to dementia  The following portions of the patient's history were reviewed and updated as appropriate: allergies, current medications, past medical history and problem list     Review of Systems     ROS limited secondary to Dementia  Review of Systems   Constitutional: Negative for activity change  Respiratory: Negative for shortness of breath      Cardiovascular: Negative for chest pain  Active Problem List     Patient Active Problem List   Diagnosis    Benign essential HTN    Stasis edema of both lower extremities    Other specified hypothyroidism    Mood disorder due to medical condition    Gastroesophageal reflux disease without esophagitis    Osteoporosis    Subdural hematoma (HCC)    Closed right hip fracture (HCC)    Dementia (HCC)    COPD (chronic obstructive pulmonary disease) (HCC)    Debility    Impacted cerumen of both ears    Cough    Low grade fever    Viral upper respiratory tract infection    Respiratory distress    Comfort measures only status       Objective     /76, HR 76, weight: 122 7 lbs    Physical Exam   Constitutional: No distress  Appearing chronically ill    Cardiovascular: Normal rate, regular rhythm and intact distal pulses  No edema noted      Pulmonary/Chest: No respiratory distress  She has no wheezes  She has no rales  Poor inspiratory effort; diminished at B/L bases but CTA   Neurological: She is alert  Skin: She is not diaphoretic  Psychiatric:   Pleasant and cooperative during exam    Nursing note and vitals reviewed  Pertinent Laboratory/Diagnostic Studies:  N/A    Current Medications       Medications reviewed and updated, see facility [facility] for details         KARINA Ornelas   [facility]  11/11/2019 3:08 PM

## 2019-12-04 ENCOUNTER — NURSING HOME VISIT (OUTPATIENT)
Dept: GERIATRICS | Facility: OTHER | Age: 84
End: 2019-12-04
Payer: MEDICARE

## 2019-12-04 DIAGNOSIS — J44.9 CHRONIC OBSTRUCTIVE PULMONARY DISEASE, UNSPECIFIED COPD TYPE (HCC): ICD-10-CM

## 2019-12-04 DIAGNOSIS — F03.90 DEMENTIA WITHOUT BEHAVIORAL DISTURBANCE, UNSPECIFIED DEMENTIA TYPE (HCC): Primary | ICD-10-CM

## 2019-12-04 DIAGNOSIS — E03.8 OTHER SPECIFIED HYPOTHYROIDISM: ICD-10-CM

## 2019-12-04 DIAGNOSIS — I87.303 STASIS EDEMA OF BOTH LOWER EXTREMITIES: ICD-10-CM

## 2019-12-04 DIAGNOSIS — I10 BENIGN ESSENTIAL HTN: Chronic | ICD-10-CM

## 2019-12-04 DIAGNOSIS — R53.81 DEBILITY: ICD-10-CM

## 2019-12-04 DIAGNOSIS — F06.30 MOOD DISORDER DUE TO MEDICAL CONDITION: ICD-10-CM

## 2019-12-04 PROCEDURE — 99309 SBSQ NF CARE MODERATE MDM 30: CPT | Performed by: STUDENT IN AN ORGANIZED HEALTH CARE EDUCATION/TRAINING PROGRAM

## 2019-12-07 NOTE — PROGRESS NOTES
[address]  [phone]  [facility] Progress Note  Billing code:32        NAME: Dilan Arreola  AGE: 80 y o  SEX: female    DATE OF ENCOUNTER:  12/04/2019    Assessment and Plan     Problem List Items Addressed This Visit        Endocrine    Other specified hypothyroidism     Most recent TSH done 7/19 which was 2 22  Continue levothyroxine 50 mcg p o  Daily  Will monitor periodically given patient's 10 year life expectancy assessment              Respiratory    COPD (chronic obstructive pulmonary disease) ([facility] [address] )     Patient remains asymptomatic with no respiratory distress  Maintaining oxygen saturations well on room air  Continue Breo Ellipta 100 mcg/25 mcg, 1 puff daily  Maintain oxygen saturations above 90%  Will continue to monitor            Cardiovascular and Mediastinum    Benign essential HTN (Chronic)     /76  Blood pressure logs remained stable  Continue furosemide 20 mg daily with potassium chloride supplementation  Will continue to monitor            Nervous and Auditory    Mood disorder due to medical condition     No obvious changes in mood or personality noted by nursing staff  Patient denies any homicidal/suicidal ideation  Continue escitalopram 10 mg p o   Daily  Continue clonazepam 0 25 mg p o  Q 12h  Encourage patient to participate in social activities at nursing home  Continued social support by family and friends  Will continue to monitor         Dementia [facility]) - Primary     No acute changes or worsening in patient's cognition per nursing staff  Patient was alert and oriente x2, was following commands readily  Patient continues on a period texture, nectar consistency diet  Reorientation and redirection as needed  Patient should be encouraged to remain active mentally, physically and socially  Recommend participating in nursing home social activities  Encourage patient to participate in cognitively challenging exercises such as cross words and puzzles  Manage chronic conditions  Maintain [place] precautions  Continue nutritional support, supportive care            Other    Stasis edema of both lower extremities     Patient with minimal lower extremity edema  Continue Lasix 10 mg daily  Continue potassium chloride supplementation with 10 mEq daily  Elevate legs when able  Compression stockings if tolerated by patient  Will continue to monitor         Elinor Dys secondary to age and chronic comorbidities  Patient continues to require 24/assistance/supervision with her ADLs during long-term care in order to function as per her baseline  Manage chronic conditions  Maintain [place] precautions  Continue nutritional support, supportive care             - Counseling Documentation: patient was counseled regarding: diagnostic results, instructions for management, risk factor reductions, prognosis, patient and family education, impressions, risks and benefits of treatment options and importance of compliance with treatment  Discussed with patient and nursing staff    Chief Complaint     Patient seen and examined for routine monthly nursing home rounds for follow-up of chronic conditions    History of Present Illness     HPI  Patient seen and examined at bedside for routine monthly long-term care nursing home rounds for follow-up of chronic conditions  Today patient says she feels ' very well and is happy that [place] time is near'  She states she has been eating and sleeping well, she has also been having regular bowel movements  No acute concerns expressed by patient or nursing staff  The patient continues to do well on levothyroxine 50 mcg p o  Daily for a history of hypothyroidism  Her mood remains stable on escitalopram 10 mg daily for a history of depression  She has also been compliant with furosemide and potassium chloride supplementation for a history of chronic venous stasis of her lower extremities      The following portions of the patient's history were reviewed and updated as appropriate: allergies, current medications, past family history, past medical history, past social history, past surgical history and problem list     Review of Systems     Review of Systems   Constitutional: Negative for activity change, chills and fever  HENT: Positive for hearing loss (Chronic)  Negative for congestion, ear pain, rhinorrhea and sore throat  Eyes: Negative for pain, discharge and redness  Respiratory: Negative for cough, chest tightness, shortness of breath, wheezing and stridor  Cardiovascular: Positive for leg swelling  Negative for chest pain and palpitations  Gastrointestinal: Negative for abdominal pain, constipation, diarrhea, nausea and vomiting  Genitourinary: Negative for decreased urine volume, dysuria and hematuria  Musculoskeletal: Positive for arthralgias (Chronic) and gait problem (Uses wheelchair)  Skin: Negative for color change, pallor, rash and wound  Neurological: Negative for dizziness, speech difficulty, weakness and light-headedness  Psychiatric/Behavioral: Negative for agitation, behavioral problems, confusion, hallucinations and suicidal ideas  Active Problem List     Patient Active Problem List   Diagnosis    Benign essential HTN    Stasis edema of both lower extremities    Other specified hypothyroidism    Mood disorder due to medical condition    Gastroesophageal reflux disease without esophagitis    Osteoporosis    Subdural hematoma (HCC)    Closed right hip fracture (HCC)    Dementia (HCC)    COPD (chronic obstructive pulmonary disease) (HCC)    Debility    Impacted cerumen of both ears    Low grade fever    Respiratory distress    Comfort measures only status       Objective     This /76, temp 97 1°, HR 76, O2 sat 96%, RR 18, weight 125 3 lbs    Physical Exam   Constitutional: She appears well-developed  No distress     Elderly female sitting in wheelchair in no obvious cardiorespiratory/painful distress   HENT:   Head: Normocephalic and atraumatic  Right Ear: External ear normal    Left Ear: External ear normal    Nose: Nose normal    Mouth/Throat: Oropharynx is clear and moist    Eyes: Conjunctivae are normal  Right eye exhibits no discharge  Left eye exhibits no discharge  Neck: Neck supple  No JVD present  Cardiovascular: Normal rate, regular rhythm and intact distal pulses  Exam reveals no gallop and no friction rub  Murmur (ESM 2/6) heard  Pulmonary/Chest: Effort normal  No stridor  No respiratory distress  She has no wheezes  She has no rales  Harsh breath sounds throughout   Abdominal: Soft  Bowel sounds are normal  She exhibits no distension  There is no tenderness  There is no guarding  Musculoskeletal: She exhibits edema (Lower extremity edema)  She exhibits no tenderness or deformity  Neurological: She is alert  She has normal reflexes  No cranial nerve deficit  Oriented x2  Moving all limbs  Follows commands readily   Skin: Skin is warm  No rash noted  She is not diaphoretic  No erythema  No pallor  Psychiatric: She has a normal mood and affect  Nursing note and vitals reviewed  Pertinent Laboratory/Diagnostic Studies:  Reviewed prior blood work from 08/12/2019  Would defer any further blood work unless any changes in acute clinical status given patient's 10 year life expectancy assessment    Current Medications   Medications were reviewed and updated in facility paper chart      Monroe Hardwick MD  Geriatric Medicine  12/8/2019 2:13 PM

## 2019-12-08 NOTE — ASSESSMENT & PLAN NOTE
/76  Blood pressure logs remained stable  Continue furosemide 20 mg daily with potassium chloride supplementation  Will continue to monitor

## 2019-12-08 NOTE — ASSESSMENT & PLAN NOTE
No obvious changes in mood or personality noted by nursing staff  Patient denies any homicidal/suicidal ideation  Continue escitalopram 10 mg p o   Daily  Continue clonazepam 0 25 mg p o  Q 12h  Encourage patient to participate in social activities at nursing home  Continued social support by family and friends  Will continue to monitor

## 2019-12-08 NOTE — ASSESSMENT & PLAN NOTE
Patient remains asymptomatic with no respiratory distress  Maintaining oxygen saturations well on room air  Continue Breo Ellipta 100 mcg/25 mcg, 1 puff daily  Maintain oxygen saturations above 90%  Will continue to monitor

## 2019-12-08 NOTE — ASSESSMENT & PLAN NOTE
Patient with minimal lower extremity edema  Continue Lasix 10 mg daily  Continue potassium chloride supplementation with 10 mEq daily  Elevate legs when able  Compression stockings if tolerated by patient  Will continue to monitor

## 2019-12-08 NOTE — ASSESSMENT & PLAN NOTE
Debility secondary to age and chronic comorbidities  Patient continues to require 24/assistance/supervision with her ADLs during long-term care in order to function as per her baseline  Manage chronic conditions  Maintain [place] precautions  Continue nutritional support, supportive care

## 2019-12-08 NOTE — ASSESSMENT & PLAN NOTE
Most recent TSH done 7/19 which was 2 22  Continue levothyroxine 50 mcg p o   Daily  Will monitor periodically given patient's 10 year life expectancy assessment

## 2019-12-08 NOTE — ASSESSMENT & PLAN NOTE
No acute changes or worsening in patient's cognition per nursing staff  Patient was alert and oriente x2, was following commands readily  Patient continues on a period texture, nectar consistency diet  Reorientation and redirection as needed  Patient should be encouraged to remain active mentally, physically and socially  Recommend participating in nursing home social activities  Encourage patient to participate in cognitively challenging exercises such as cross words and puzzles  Manage chronic conditions  Maintain [place] precautions  Continue nutritional support, supportive care

## 2020-02-02 ENCOUNTER — NURSING HOME VISIT (OUTPATIENT)
Dept: GERIATRICS | Facility: OTHER | Age: 85
End: 2020-02-02
Payer: MEDICARE

## 2020-02-02 DIAGNOSIS — J44.9 CHRONIC OBSTRUCTIVE PULMONARY DISEASE, UNSPECIFIED COPD TYPE (HCC): Primary | ICD-10-CM

## 2020-02-02 DIAGNOSIS — R54 FRAILTY SYNDROME IN GERIATRIC PATIENT: ICD-10-CM

## 2020-02-02 DIAGNOSIS — E03.9 HYPOTHYROIDISM, UNSPECIFIED TYPE: ICD-10-CM

## 2020-02-02 DIAGNOSIS — F03.90 DEMENTIA WITHOUT BEHAVIORAL DISTURBANCE, UNSPECIFIED DEMENTIA TYPE (HCC): ICD-10-CM

## 2020-02-02 DIAGNOSIS — I87.303 STASIS EDEMA OF BOTH LOWER EXTREMITIES: ICD-10-CM

## 2020-02-02 DIAGNOSIS — F43.23 ADJUSTMENT DISORDER WITH MIXED ANXIETY AND DEPRESSED MOOD: ICD-10-CM

## 2020-02-02 DIAGNOSIS — I10 BENIGN ESSENTIAL HTN: Chronic | ICD-10-CM

## 2020-02-02 PROBLEM — R06.03 RESPIRATORY DISTRESS: Status: RESOLVED | Noted: 2019-08-14 | Resolved: 2020-02-02

## 2020-02-02 PROBLEM — H61.23 IMPACTED CERUMEN OF BOTH EARS: Status: RESOLVED | Noted: 2019-08-07 | Resolved: 2020-02-02

## 2020-02-02 PROBLEM — R50.9 LOW GRADE FEVER: Status: RESOLVED | Noted: 2019-08-12 | Resolved: 2020-02-02

## 2020-02-02 PROCEDURE — 99309 SBSQ NF CARE MODERATE MDM 30: CPT | Performed by: INTERNAL MEDICINE

## 2020-02-02 NOTE — PROGRESS NOTES
[facility]  [address] [place] [address]  [facility]ility[facility]    NAME: Nichol Diaz  AGE: 80 y o  SEX: female    DATE OF ENCOUNTER: 2/2/2020    Code status:  AND/DNR    Assessment and Plan     1  COPD  -she is doing well with Tod Range  -continue with monitoring    2  Dementia without behavioral disturbance  -she is doing well with care/support at long-term care facility  -continue monitoring    3  Adjustment disorder with combined anxiety and depressed mood  -she is doing well with escitalopram and clonazepam  -she failed GDR of escitalopram in mid to late 2019  -continue with monitoring    4  Hypothyroidism  -she is doing well with levothyroxine  -continue with clinical and periodic laboratory monitoring    5  Hypertension  -she is doing well with furosemide and potassium  -continue with clinical and periodic laboratory monitoring    6  Stasis edema bilateral extremities  -doing well with furosemide and potassium  -continue with clinical and periodic laboratory monitoring    7  Debility secondary to her chronic medical conditions  -she still requires 24/7 care/support of her ADLs  -continue with care/support of her ADLs at long-term care facility level of care  -continue with monitoring    8  Frailty syndrome in geriatric patient  -currently she scores a level 7 on the clinical frailty score  -continue with care/support at [facility] facility  -continue monitoring    9  Goals of care  -per POLST of August 2019, she is DNR and comfort care    She follows with the facility podiatrist and dentist     All medications and routine orders were reviewed and updated as needed      Plan discussed with: Nurse    Chief Complaint     She is seen with female nursing staff for a follow-up visit to update the care and treatment of her COPD, dementia without behavioral disturbance, hypothyroidism, hypertension, and debility secondary to her chronic medical conditions  History of Present Illness     She is a pleasant 8 year old woman who is seen with female nursing staff for a follow-up visit to update the care and treatment of her COPD-doing well with Yobany Batters, dementia without behavioral disturbance-doing well with care/support at long-term care facility, hypothyroidism-doing well with levothyroxine, hypertension-doing well with furosemide/potassium therapy, and debility secondary to her chronic medical conditions-she still requires 24/7 care/support of her ADLs  When asked how she is doing, she states doing well  The following portions of the patient's history were reviewed and updated as appropriate: current medications, past medical history, past surgical history and problem list     Allergies:  No Known Allergies    Review of Systems     Review of Systems   Unable to perform ROS: Dementia       Medications and orders     All medications reviewed and updated in Nursing Home EMR  Objective     Vitals:  Weight 121 2 lb (stable)  Monthly vitals-pulse 70 and blood pressure 140/60  Physical Exam   Constitutional: Vital signs are normal  She appears well-developed and well-nourished  She is cooperative  Non-toxic appearance  No distress  She is awake and appears comfortable in her wheelchair, younger than her stated age, and frail  Eyes: Conjunctivae are normal  No scleral icterus  Cardiovascular: Normal rate, regular rhythm and normal heart sounds  Exam reveals no gallop and no friction rub  No murmur heard  Pulmonary/Chest: Effort normal and breath sounds normal  No stridor  No respiratory distress  She has no wheezes  She has no rales  Abdominal: Soft  She exhibits no distension and no mass  There is no tenderness  There is no guarding  Musculoskeletal: She exhibits no edema  Neurological: She is alert  Vitals reviewed  Pertinent Laboratory/Diagnostic Studies:      The following labs were reviewed please see chart or hospital paperwork for details  July 18, 2019:    TSH-2 22 August 12, 2019:    CBC with diff-WBC count 4 6, hemoglobin 10 6, hematocrit 31 9, platelet count [id number]    CMP-sodium 140, potassium 4 8, BUN 23, creatinine 0 9, fasting blood sugar 104    - Treatment plan reviewed with nursing staff      RADHA Dailey   2/2/2020 9:36 AM

## 2020-03-29 ENCOUNTER — NURSING HOME VISIT (OUTPATIENT)
Dept: GERIATRICS | Facility: OTHER | Age: 85
End: 2020-03-29
Payer: MEDICARE

## 2020-03-29 DIAGNOSIS — I87.303 STASIS EDEMA OF BOTH LOWER EXTREMITIES: ICD-10-CM

## 2020-03-29 DIAGNOSIS — E03.9 HYPOTHYROIDISM, UNSPECIFIED TYPE: ICD-10-CM

## 2020-03-29 DIAGNOSIS — I10 HYPERTENSION, UNSPECIFIED TYPE: ICD-10-CM

## 2020-03-29 DIAGNOSIS — R53.81 DEBILITY: Primary | ICD-10-CM

## 2020-03-29 DIAGNOSIS — J44.9 CHRONIC OBSTRUCTIVE PULMONARY DISEASE, UNSPECIFIED COPD TYPE (HCC): ICD-10-CM

## 2020-03-29 DIAGNOSIS — R54 FRAILTY SYNDROME IN GERIATRIC PATIENT: ICD-10-CM

## 2020-03-29 DIAGNOSIS — F03.91 DEMENTIA WITH BEHAVIORAL DISTURBANCE, UNSPECIFIED DEMENTIA TYPE (HCC): ICD-10-CM

## 2020-03-29 PROCEDURE — 99309 SBSQ NF CARE MODERATE MDM 30: CPT | Performed by: INTERNAL MEDICINE

## 2020-03-30 NOTE — PROGRESS NOTES
[facility]  [address] [place] [address]  Facility: [address] [facility]      NAME: Mariama Ng  AGE: 80 y o  SEX: female    DATE OF ENCOUNTER: 3/29/2020    Code status:  DNR/DNH    Assessment and Plan     1  Debility secondary to her chronic medical conditions  -she still requires 24/7 care/support of her ADLs  -I recommend continued care/support of her ADLs at long-term care facility level of care  -continue with monitoring    2  COPD  -she is doing well with Breo Ellipta inhaler  -continue with monitoring    3  Dementia with behavior disturbance  -she is doing well with escitalopram and clonazepam  -continue with monitoring    4  Hypothyroidism  -she is doing well with levothyroxine  -continue with clinical and periodic laboratory monitoring    5  Hypertension  -review of her BP log shows that she is doing well with TLC  -continue with monitoring    6  Lower extremity edema  -she is doing well with furosemide and potassium  -continue with clinical and periodic laboratory monitoring    7  Frailty in geriatric patient  -she is level 7 on the clinical frailty scale consistent with being severely frail  -continue with care/support at long-term care facility  -goals of care  -DNR/comfort care per POLST of August 2019    Will consider ordering laboratory work for monitoring purposes in May 2020 after the COVID-19 pandemic eases  All medications and routine orders were reviewed and updated as needed  Plan discussed with: Nurse    Chief Complaint     She is seen with female nursing staff for a follow-up visit to update the care and treatment of her COPD, dementia with behavioral disturbance, hypothyroidism, and debility secondary to her chronic medical conditions      History of Present Illness     She is a pleasant 8 year old woman who is seen with female nursing staff for a follow-up visit to update the care and treatment of her COPD-she is doing well with Mike Barron, dementia with behavioral disturbance-she is doing well with escitalopram/clonazepam, hypothyroidism-doing well with levothyroxine, and debility secondary to her chronic medical conditions-she still requires 24/7 care/support of her ADLs  Nursing endorses that her appetite is good and that she still complains about the food  Review of her dashboard shows that she has used no as needed medications in the last 7 days and 2 as needed Tylenol dosages the last 30 days  The following portions of the patient's history were reviewed and updated as appropriate: current medications, past medical history, past social history, past surgical history and problem list     Allergies:  No Known Allergies    Review of Systems     Review of Systems   Unable to perform ROS: Dementia       Medications and orders     All medications reviewed and updated in Nursing Home EMR  Objective     Vitals:  Weight 121 9 lb (stable), temperature 98 9° F, pulse 70, blood pressure 130/60  BP/AP logs look well  Physical Exam   Constitutional: Vital signs are normal  She appears well-developed and well-nourished  She is cooperative  Non-toxic appearance  No distress  She is awake and appears comfortable sitting in her wheelchair, stated age, and frail  Eyes: No scleral icterus  Cardiovascular: Normal rate, regular rhythm and normal heart sounds  Exam reveals no gallop and no friction rub  No murmur heard  Pulmonary/Chest: Effort normal and breath sounds normal  No stridor  No respiratory distress  She has no wheezes  She has no rales  Abdominal: Soft  She exhibits no distension and no mass  There is no tenderness  There is no guarding  Musculoskeletal: She exhibits no edema (There is no pretibial edema, bilaterally  )  Neurological: She is alert  - Treatment plan reviewed with nursing staff      RADHA Graham   3/29/2020 10:57 PM

## 2020-04-21 ENCOUNTER — TELEMEDICINE (OUTPATIENT)
Dept: GERIATRICS | Facility: OTHER | Age: 85
End: 2020-04-21
Payer: MEDICARE

## 2020-04-21 DIAGNOSIS — R50.9 FEVER, UNSPECIFIED FEVER CAUSE: Primary | ICD-10-CM

## 2020-04-21 PROCEDURE — 99308 SBSQ NF CARE LOW MDM 20: CPT | Performed by: NURSE PRACTITIONER

## 2020-04-26 ENCOUNTER — TELEPHONE (OUTPATIENT)
Dept: OTHER | Facility: OTHER | Age: 85
End: 2020-04-26